# Patient Record
Sex: FEMALE | Race: WHITE | HISPANIC OR LATINO | Employment: UNEMPLOYED | URBAN - METROPOLITAN AREA
[De-identification: names, ages, dates, MRNs, and addresses within clinical notes are randomized per-mention and may not be internally consistent; named-entity substitution may affect disease eponyms.]

---

## 2020-01-27 LAB
HBV SURFACE AG SERPL QL IA: NEGATIVE
HIV 1+2 AB+HIV1 P24 AG SERPL QL IA: NEGATIVE
RUBELLA ABY IGG: NORMAL
TREPONEMA ANTIBODIES: NONREACTIVE

## 2020-07-05 ENCOUNTER — HOSPITAL ENCOUNTER (INPATIENT)
Facility: CLINIC | Age: 35
LOS: 2 days | Discharge: HOME | End: 2020-07-07
Attending: OBSTETRICS & GYNECOLOGY | Admitting: OBSTETRICS & GYNECOLOGY
Payer: COMMERCIAL

## 2020-07-05 PROBLEM — Z36.89 ENCOUNTER FOR TRIAGE IN PREGNANT PATIENT: Status: ACTIVE | Noted: 2020-07-05

## 2020-07-05 LAB
ABO + RH BLD: NORMAL
ABO + RH BLD: NORMAL
AMPHETAMINES UR QL SCN: NEGATIVE
BASOPHILS # BLD AUTO: 0 10E9/L (ref 0–0.2)
BASOPHILS NFR BLD AUTO: 0.3 %
BLD GP AB SCN SERPL QL: NORMAL
BLOOD BANK CMNT PATIENT-IMP: NORMAL
CANNABINOIDS UR QL: NEGATIVE
COCAINE UR QL: NEGATIVE
DIFFERENTIAL METHOD BLD: ABNORMAL
EOSINOPHIL # BLD AUTO: 0.1 10E9/L (ref 0–0.7)
EOSINOPHIL NFR BLD AUTO: 0.8 %
ERYTHROCYTE [DISTWIDTH] IN BLOOD BY AUTOMATED COUNT: 13.8 % (ref 10–15)
GLUCOSE BLDC GLUCOMTR-MCNC: 95 MG/DL (ref 70–99)
GLUCOSE SERPL-MCNC: 115 MG/DL (ref 70–99)
HCT VFR BLD AUTO: 37.1 % (ref 35–47)
HGB BLD-MCNC: 12.3 G/DL (ref 11.7–15.7)
IMM GRANULOCYTES # BLD: 0.1 10E9/L (ref 0–0.4)
IMM GRANULOCYTES NFR BLD: 0.7 %
LYMPHOCYTES # BLD AUTO: 1.7 10E9/L (ref 0.8–5.3)
LYMPHOCYTES NFR BLD AUTO: 13.5 %
MCH RBC QN AUTO: 28.7 PG (ref 26.5–33)
MCHC RBC AUTO-ENTMCNC: 33.2 G/DL (ref 31.5–36.5)
MCV RBC AUTO: 87 FL (ref 78–100)
MONOCYTES # BLD AUTO: 0.9 10E9/L (ref 0–1.3)
MONOCYTES NFR BLD AUTO: 7 %
NEUTROPHILS # BLD AUTO: 9.6 10E9/L (ref 1.6–8.3)
NEUTROPHILS NFR BLD AUTO: 77.7 %
NRBC # BLD AUTO: 0 10*3/UL
NRBC BLD AUTO-RTO: 0 /100
OPIATES UR QL SCN: NEGATIVE
PCP UR QL SCN: NEGATIVE
PLATELET # BLD AUTO: 261 10E9/L (ref 150–450)
RBC # BLD AUTO: 4.28 10E12/L (ref 3.8–5.2)
SARS-COV-2 PCR COMMENT: NORMAL
SARS-COV-2 RNA SPEC QL NAA+PROBE: NEGATIVE
SARS-COV-2 RNA SPEC QL NAA+PROBE: NORMAL
SPECIMEN EXP DATE BLD: NORMAL
SPECIMEN SOURCE: NORMAL
SPECIMEN SOURCE: NORMAL
WBC # BLD AUTO: 12.4 10E9/L (ref 4–11)

## 2020-07-05 PROCEDURE — G0463 HOSPITAL OUTPT CLINIC VISIT: HCPCS

## 2020-07-05 PROCEDURE — U0003 INFECTIOUS AGENT DETECTION BY NUCLEIC ACID (DNA OR RNA); SEVERE ACUTE RESPIRATORY SYNDROME CORONAVIRUS 2 (SARS-COV-2) (CORONAVIRUS DISEASE [COVID-19]), AMPLIFIED PROBE TECHNIQUE, MAKING USE OF HIGH THROUGHPUT TECHNOLOGIES AS DESCRIBED BY CMS-2020-01-R: HCPCS | Performed by: OBSTETRICS & GYNECOLOGY

## 2020-07-05 PROCEDURE — 72200001 ZZH LABOR CARE VAGINAL DELIVERY SINGLE

## 2020-07-05 PROCEDURE — 25800030 ZZH RX IP 258 OP 636: Performed by: OBSTETRICS & GYNECOLOGY

## 2020-07-05 PROCEDURE — 80307 DRUG TEST PRSMV CHEM ANLYZR: CPT | Performed by: OBSTETRICS & GYNECOLOGY

## 2020-07-05 PROCEDURE — 86900 BLOOD TYPING SEROLOGIC ABO: CPT | Performed by: OBSTETRICS & GYNECOLOGY

## 2020-07-05 PROCEDURE — 25000125 ZZHC RX 250: Performed by: OBSTETRICS & GYNECOLOGY

## 2020-07-05 PROCEDURE — 00000146 ZZHCL STATISTIC GLUCOSE BY METER IP

## 2020-07-05 PROCEDURE — 85025 COMPLETE CBC W/AUTO DIFF WBC: CPT | Performed by: OBSTETRICS & GYNECOLOGY

## 2020-07-05 PROCEDURE — 12000000 ZZH R&B MED SURG/OB

## 2020-07-05 PROCEDURE — 25000132 ZZH RX MED GY IP 250 OP 250 PS 637

## 2020-07-05 PROCEDURE — 86901 BLOOD TYPING SEROLOGIC RH(D): CPT | Performed by: OBSTETRICS & GYNECOLOGY

## 2020-07-05 PROCEDURE — 25000128 H RX IP 250 OP 636: Performed by: OBSTETRICS & GYNECOLOGY

## 2020-07-05 PROCEDURE — 82947 ASSAY GLUCOSE BLOOD QUANT: CPT | Performed by: OBSTETRICS & GYNECOLOGY

## 2020-07-05 PROCEDURE — 0HQ9XZZ REPAIR PERINEUM SKIN, EXTERNAL APPROACH: ICD-10-PCS | Performed by: OBSTETRICS & GYNECOLOGY

## 2020-07-05 PROCEDURE — 86850 RBC ANTIBODY SCREEN: CPT | Performed by: OBSTETRICS & GYNECOLOGY

## 2020-07-05 PROCEDURE — 86780 TREPONEMA PALLIDUM: CPT | Performed by: OBSTETRICS & GYNECOLOGY

## 2020-07-05 PROCEDURE — 25000132 ZZH RX MED GY IP 250 OP 250 PS 637: Performed by: OBSTETRICS & GYNECOLOGY

## 2020-07-05 RX ORDER — BISACODYL 10 MG
10 SUPPOSITORY, RECTAL RECTAL DAILY PRN
Status: DISCONTINUED | OUTPATIENT
Start: 2020-07-07 | End: 2020-07-07 | Stop reason: HOSPADM

## 2020-07-05 RX ORDER — SODIUM CHLORIDE, SODIUM LACTATE, POTASSIUM CHLORIDE, CALCIUM CHLORIDE 600; 310; 30; 20 MG/100ML; MG/100ML; MG/100ML; MG/100ML
INJECTION, SOLUTION INTRAVENOUS CONTINUOUS
Status: DISCONTINUED | OUTPATIENT
Start: 2020-07-05 | End: 2020-07-05

## 2020-07-05 RX ORDER — NALOXONE HYDROCHLORIDE 0.4 MG/ML
.1-.4 INJECTION, SOLUTION INTRAMUSCULAR; INTRAVENOUS; SUBCUTANEOUS
Status: DISCONTINUED | OUTPATIENT
Start: 2020-07-05 | End: 2020-07-05

## 2020-07-05 RX ORDER — TRANEXAMIC ACID 10 MG/ML
1 INJECTION, SOLUTION INTRAVENOUS EVERY 30 MIN PRN
Status: DISCONTINUED | OUTPATIENT
Start: 2020-07-05 | End: 2020-07-07 | Stop reason: HOSPADM

## 2020-07-05 RX ORDER — IBUPROFEN 800 MG/1
800 TABLET, FILM COATED ORAL EVERY 6 HOURS PRN
Status: DISCONTINUED | OUTPATIENT
Start: 2020-07-05 | End: 2020-07-07 | Stop reason: HOSPADM

## 2020-07-05 RX ORDER — CARBOPROST TROMETHAMINE 250 UG/ML
250 INJECTION, SOLUTION INTRAMUSCULAR
Status: DISCONTINUED | OUTPATIENT
Start: 2020-07-05 | End: 2020-07-05

## 2020-07-05 RX ORDER — AMOXICILLIN 250 MG
2 CAPSULE ORAL 2 TIMES DAILY
Status: DISCONTINUED | OUTPATIENT
Start: 2020-07-05 | End: 2020-07-07 | Stop reason: HOSPADM

## 2020-07-05 RX ORDER — CARBOPROST TROMETHAMINE 250 UG/ML
250 INJECTION, SOLUTION INTRAMUSCULAR
Status: DISCONTINUED | OUTPATIENT
Start: 2020-07-05 | End: 2020-07-07 | Stop reason: HOSPADM

## 2020-07-05 RX ORDER — NICOTINE POLACRILEX 4 MG
15-30 LOZENGE BUCCAL
Status: DISCONTINUED | OUTPATIENT
Start: 2020-07-05 | End: 2020-07-07 | Stop reason: HOSPADM

## 2020-07-05 RX ORDER — HYDROCORTISONE 2.5 %
CREAM (GRAM) TOPICAL 3 TIMES DAILY PRN
Status: DISCONTINUED | OUTPATIENT
Start: 2020-07-05 | End: 2020-07-07 | Stop reason: HOSPADM

## 2020-07-05 RX ORDER — MISOPROSTOL 200 UG/1
800 TABLET ORAL ONCE
Status: COMPLETED | OUTPATIENT
Start: 2020-07-05 | End: 2020-07-05

## 2020-07-05 RX ORDER — MODIFIED LANOLIN
OINTMENT (GRAM) TOPICAL
Status: DISCONTINUED | OUTPATIENT
Start: 2020-07-05 | End: 2020-07-07 | Stop reason: HOSPADM

## 2020-07-05 RX ORDER — ONDANSETRON 2 MG/ML
4 INJECTION INTRAMUSCULAR; INTRAVENOUS EVERY 6 HOURS PRN
Status: DISCONTINUED | OUTPATIENT
Start: 2020-07-05 | End: 2020-07-05

## 2020-07-05 RX ORDER — METHYLERGONOVINE MALEATE 0.2 MG/ML
200 INJECTION INTRAVENOUS
Status: COMPLETED | OUTPATIENT
Start: 2020-07-05 | End: 2020-07-05

## 2020-07-05 RX ORDER — ACETAMINOPHEN 325 MG/1
650 TABLET ORAL EVERY 4 HOURS PRN
Status: DISCONTINUED | OUTPATIENT
Start: 2020-07-05 | End: 2020-07-05

## 2020-07-05 RX ORDER — OXYTOCIN 10 [USP'U]/ML
10 INJECTION, SOLUTION INTRAMUSCULAR; INTRAVENOUS
Status: DISCONTINUED | OUTPATIENT
Start: 2020-07-05 | End: 2020-07-05

## 2020-07-05 RX ORDER — OXYTOCIN/0.9 % SODIUM CHLORIDE 30/500 ML
100-340 PLASTIC BAG, INJECTION (ML) INTRAVENOUS CONTINUOUS PRN
Status: COMPLETED | OUTPATIENT
Start: 2020-07-05 | End: 2020-07-05

## 2020-07-05 RX ORDER — FENTANYL CITRATE 50 UG/ML
50-100 INJECTION, SOLUTION INTRAMUSCULAR; INTRAVENOUS
Status: DISCONTINUED | OUTPATIENT
Start: 2020-07-05 | End: 2020-07-05

## 2020-07-05 RX ORDER — MISOPROSTOL 200 UG/1
TABLET ORAL
Status: COMPLETED
Start: 2020-07-05 | End: 2020-07-05

## 2020-07-05 RX ORDER — OXYTOCIN 10 [USP'U]/ML
10 INJECTION, SOLUTION INTRAMUSCULAR; INTRAVENOUS
Status: DISCONTINUED | OUTPATIENT
Start: 2020-07-05 | End: 2020-07-07 | Stop reason: HOSPADM

## 2020-07-05 RX ORDER — ACETAMINOPHEN 325 MG/1
650 TABLET ORAL EVERY 4 HOURS PRN
Status: DISCONTINUED | OUTPATIENT
Start: 2020-07-05 | End: 2020-07-07 | Stop reason: HOSPADM

## 2020-07-05 RX ORDER — NALOXONE HYDROCHLORIDE 0.4 MG/ML
.1-.4 INJECTION, SOLUTION INTRAMUSCULAR; INTRAVENOUS; SUBCUTANEOUS
Status: DISCONTINUED | OUTPATIENT
Start: 2020-07-05 | End: 2020-07-07 | Stop reason: HOSPADM

## 2020-07-05 RX ORDER — DEXTROSE MONOHYDRATE 25 G/50ML
25-50 INJECTION, SOLUTION INTRAVENOUS
Status: DISCONTINUED | OUTPATIENT
Start: 2020-07-05 | End: 2020-07-07 | Stop reason: HOSPADM

## 2020-07-05 RX ORDER — OXYCODONE AND ACETAMINOPHEN 5; 325 MG/1; MG/1
1 TABLET ORAL
Status: DISCONTINUED | OUTPATIENT
Start: 2020-07-05 | End: 2020-07-05

## 2020-07-05 RX ORDER — MISOPROSTOL 200 UG/1
800 TABLET ORAL
Status: DISCONTINUED | OUTPATIENT
Start: 2020-07-05 | End: 2020-07-07 | Stop reason: HOSPADM

## 2020-07-05 RX ORDER — OXYTOCIN/0.9 % SODIUM CHLORIDE 30/500 ML
100 PLASTIC BAG, INJECTION (ML) INTRAVENOUS CONTINUOUS
Status: DISCONTINUED | OUTPATIENT
Start: 2020-07-05 | End: 2020-07-07 | Stop reason: HOSPADM

## 2020-07-05 RX ORDER — AMOXICILLIN 250 MG
1 CAPSULE ORAL 2 TIMES DAILY
Status: DISCONTINUED | OUTPATIENT
Start: 2020-07-05 | End: 2020-07-07 | Stop reason: HOSPADM

## 2020-07-05 RX ORDER — TRANEXAMIC ACID 10 MG/ML
1 INJECTION, SOLUTION INTRAVENOUS EVERY 30 MIN PRN
Status: DISCONTINUED | OUTPATIENT
Start: 2020-07-05 | End: 2020-07-05

## 2020-07-05 RX ORDER — METHYLERGONOVINE MALEATE 0.2 MG/ML
200 INJECTION INTRAVENOUS
Status: DISCONTINUED | OUTPATIENT
Start: 2020-07-05 | End: 2020-07-07 | Stop reason: HOSPADM

## 2020-07-05 RX ORDER — IBUPROFEN 800 MG/1
800 TABLET, FILM COATED ORAL
Status: DISCONTINUED | OUTPATIENT
Start: 2020-07-05 | End: 2020-07-05

## 2020-07-05 RX ORDER — OXYTOCIN/0.9 % SODIUM CHLORIDE 30/500 ML
340 PLASTIC BAG, INJECTION (ML) INTRAVENOUS CONTINUOUS PRN
Status: DISCONTINUED | OUTPATIENT
Start: 2020-07-05 | End: 2020-07-07 | Stop reason: HOSPADM

## 2020-07-05 RX ADMIN — LIDOCAINE HYDROCHLORIDE 20 ML: 10 INJECTION, SOLUTION EPIDURAL; INFILTRATION; INTRACAUDAL; PERINEURAL at 14:30

## 2020-07-05 RX ADMIN — IBUPROFEN 800 MG: 800 TABLET ORAL at 22:56

## 2020-07-05 RX ADMIN — METHYLERGONOVINE MALEATE 200 MCG: 0.2 INJECTION, SOLUTION INTRAMUSCULAR; INTRAVENOUS at 14:24

## 2020-07-05 RX ADMIN — Medication 340 ML/HR: at 14:18

## 2020-07-05 RX ADMIN — IBUPROFEN 800 MG: 800 TABLET ORAL at 15:42

## 2020-07-05 RX ADMIN — SODIUM CHLORIDE, POTASSIUM CHLORIDE, SODIUM LACTATE AND CALCIUM CHLORIDE 500 ML/HR: 600; 310; 30; 20 INJECTION, SOLUTION INTRAVENOUS at 12:45

## 2020-07-05 RX ADMIN — MISOPROSTOL 800 MCG: 200 TABLET ORAL at 14:19

## 2020-07-05 NOTE — PLAN OF CARE
Data: Alison Khan transferred to 442 via wheelchair at 1850. Baby transferred via parent's arms.  Action: Receiving unit notified of transfer: Yes. Patient and family notified of room change. Report given to Jen at 1850. Belongings sent to receiving unit. Accompanied by Registered Nurse. Oriented patient to surroundings. Call light within reach. ID bands double-checked with receiving RN.  Response: Patient tolerated transfer and is stable.

## 2020-07-05 NOTE — PROVIDER NOTIFICATION
Everton Guerra William, no call needed.   Baby is assigned to this group because they are doc-of-the-day: Yes.

## 2020-07-05 NOTE — PLAN OF CARE
Alison here with SROM around 10am.  Grossly ruptured with clear fluid on admission.  Ctx's every 3-5 minutes and palpate strong.  7cm/90/0.  Baby active and cat 1 tracing.  Dr. Mcgrath updated and here at 1300.  Alison does speak English but requesting  for medical explanations.  Reviewed Health hx again, POC and baby meds/blood sugar checks with  over Ipad.  Stated understanding.  Wanting to deliver without pain medications.

## 2020-07-05 NOTE — H&P
OB History and Physical      Alison Khan MRN# [id number]   Age: 34 year old YOB: 1985     CC:  SROM, labor    An professional Welsh interpretor was used for this visit via the ipad.    HPI:  Ms. Alison Khan is a 34 year old  at 39w3d by LMP c/w 19 week US who presents with SROM and spontaneous active labor. She reports +FM. No vaginal bleeding. She denies recent cough, cold symptoms, or fevers. No known COVID-19 symptoms.     She received prenatal care at [facility] and had planned to deliver there. Her first two children were born there and she denies any complications with her first two pregnancies.     Prenatal records have been requested.     Pregnancy Complications:  - Scant prenatal care- reports last visit was 6 weeks ago. Reports all visits since 3 months of pregnancy have been via telephone.   - Per prenatal records- presumed type II DM. Failed 1 hr and 2 hr GTT at 16 weeks. Unknown control due to infrequent testing.   - AMA: declined genetic testing    Prenatal Labs:   AB+, antibody screen negative, Rubella immune, RPR non-reactive, HBSAG non-reactive, HIV non-reactive, urine culture negative, GC/chalm negative, pap smear NILM (19)    1 hr GCT: 159  2 hr GTT: 88, 217 (high), 205 (high)    Lab Results   Component Value Date    ABO PENDING 2020    AS PENDING 2020    HGB 12.3 2020       GBS Status: unknown    OB History   2001: [facility], 8lb 6oz, 3rd degree laceration   13: [facility], 7lb 6oz, PPH. Did not require transfusion. Discharge hemoglobin 7.3 per records.  SAB at 14 weeks 19    PMHx: denies  PSHx: Gallbladder surgery one year ago   Meds: Prenatal vitamins  Allergies:  No Known Allergies   FmHx: Non-contributory  SocHx: She denies any tobacco, alcohol, or other drug use during this pregnancy.    ROS:   Complete 10-point ROS negative except as noted in HPI. She denies headache, blurry vision, chest pain, shortness of breath, RUQ pain, nausea,  vomiting, dysuria, hematuria or extremity edema.    PE:  Vit:   Patient Vitals for the past 4 hrs:   BP Temp Temp src Resp   20 1215 104/68 98  F (36.7  C) Oral 18      Gen: Well-appearing, NAD, uncomfortable with contractions  Pulm: Breathing comfortably on room air  Abd: Soft, gravid, non-tender  Ext: no LE edema b/l  Cx: 7cm per RN    Pres:  cephalic by cervical exam  EFW:  8lb by Leopold's  Memb: SROM, grossly ruptured, clear              FHT: Baseline 135, moderate variability, + accelerations, intermittent early decelerations   [place]: q2-3 minutes      Assessment: 34 year old  at 39w3d here with SROM and active labor. Had planned delivery at [facility]. Pregnancy complicated by scant prenatal care and presumed type II DM (diagnosed at 16 weeks) with unknown control. COVID-19 PCR pending.    Plan  Admit to L&D  Labor: Expectant management  Type II DM versus GDM: diagnosed at 16 weeks. Unknown control as did not regularly check BG levels.   FWB: Category I FHT.  Continue EFM and toco  GBS:  unknown. Penicillin not indicated.  Pain: Desires unmedicated delivery  Rh: AB+  Anticipate       Margot Mcgrath MD  [facility]  2020 1:15 PM

## 2020-07-05 NOTE — L&D DELIVERY NOTE
OB Vaginal Delivery Note      HPI:  Pt is a 34 year old  @ 39w3d who presented to L&D on 20 for SROM and active labor.      Prenatal Course:  [place] [facility] [place] Scant prenatal care.     Prenatal labs:  AB+, antibody screen negative, Rubella immune, RPR non-reactive, HBSAG non-reactive, HIV non-reactive, urine culture negative, GC/chalm negative, pap smear NILM (19); GBS UNKNOWN     1 hr GCT: 159  2 hr GTT: 88, 217 (high), 205 (high)    Pregnancy complications:    - Scant prenatal care- reports last visit was 6 weeks ago. Reports all visits since 3 months of pregnancy have been via telephone.   - Per prenatal records- presumed type II DM. Failed 1 hr and 2 hr GTT at 16 weeks. Unknown control due to infrequent testing.  - AMA, declined genetic testing    OB History:   History of  x2, history of PPH (did not require transfusion, discharge hemoglobin 7.3)    Hospital Course:    First Stage:  Patient was admitted to L&D on 20 for SROM and labor.  FHTs were category I, reactive. Contractions were every 2-3 minutes.  Abdomen was non-tender.  EFW was 8lb.  SROM occurred at 10am with clear fluid noted.  Random glucose was 115 on arrival. Patient's labor progressed quickly to complete.  She declined analgesia.  Patient reached complete cervical dilation at 1350 on 2020.    Second Stage:  Patient was allowed to begin pushing after reaching complete cervical dilation.  Good maternal expulsive efforts were noted.  Fetal heart tones remained reassuring during the second stage.  She was able to bring the fetal vertex to a full crown.  The fetal vertex was then easily delivered, followed by the fetal shoulders without complications. The remainder of the infant was then delivered. After 60 seconds the cord was clamped and cut and the infant was placed on the maternal abdomen.  The infants weight was pending at the time of this note.  Apgars were 9 and 9 at one and five minutes.      Third  Stage:  The placenta then delivered shortly thereafter.  It was noted to be in tact with a three vessel cord.  The patient's perineum was inspected and a first degree laceration was noted. The uterus was boggy with brisk bleeding and she received pitocin through the IV, IM methergine, and 800 mcg rectal cytotec with improvement in bleeding and uterine tone. The area was infiltrated with 1% lidocaine and repaired in the usual fashion using 3-0 vicryl suture.  EBL for the procedure was 500 ml.  COVID-19 PCR pending at the time of delivery.    The patient tolerated the delivery well.  Sponge and needle counts were correct.  The patient and infant remained in the delivery suite following delivery in stable condition.    Margot Mcgrath MD  7/5/2020 2:51 PM

## 2020-07-06 ENCOUNTER — APPOINTMENT (OUTPATIENT)
Dept: INTERPRETER SERVICES | Facility: CLINIC | Age: 35
End: 2020-07-06
Payer: COMMERCIAL

## 2020-07-06 LAB
GLUCOSE BLDC GLUCOMTR-MCNC: 77 MG/DL (ref 70–99)
HGB BLD-MCNC: 10.6 G/DL (ref 11.7–15.7)
T PALLIDUM AB SER QL: NONREACTIVE

## 2020-07-06 PROCEDURE — 00000146 ZZHCL STATISTIC GLUCOSE BY METER IP

## 2020-07-06 PROCEDURE — 12000000 ZZH R&B MED SURG/OB

## 2020-07-06 PROCEDURE — 85018 HEMOGLOBIN: CPT | Performed by: OBSTETRICS & GYNECOLOGY

## 2020-07-06 PROCEDURE — 25000132 ZZH RX MED GY IP 250 OP 250 PS 637: Performed by: OBSTETRICS & GYNECOLOGY

## 2020-07-06 PROCEDURE — 36415 COLL VENOUS BLD VENIPUNCTURE: CPT | Performed by: OBSTETRICS & GYNECOLOGY

## 2020-07-06 RX ADMIN — IBUPROFEN 800 MG: 800 TABLET ORAL at 06:13

## 2020-07-06 RX ADMIN — IBUPROFEN 800 MG: 800 TABLET ORAL at 18:30

## 2020-07-06 RX ADMIN — IBUPROFEN 800 MG: 800 TABLET ORAL at 12:25

## 2020-07-06 RX ADMIN — DOCUSATE SODIUM AND SENNOSIDES 1 TABLET: 8.6; 5 TABLET ORAL at 12:25

## 2020-07-06 NOTE — PROGRESS NOTES
OB Post-partum Note  PPD# 1    S:  Patient doing well.  Pain controlled.  Voiding.  Bleeding is normal.  Breast feeding. Patient reports typical cramping post partum.    O:  BP 90/56   Temp 99  F (37.2  C) (Oral)   Resp 20   Breastfeeding Unknown   Gen- A&O, NAD  Abd- Non-tender, fundus non tender and firm   Ext- non-tender, no calf pain    Hemoglobin   Date Value Ref Range Status   2020 10.6 (L) 11.7 - 15.7 g/dL Final     AB positive  Rubella Immune    A/P: 34 year old  PPD# 1 s/p . No apparent post partum complications.  Prenatal care elsewhere, routine labs reviewed and complete.  GBS status was unknown.     1.  Routine post-partum cares  2.  Analgesia, Ibuprofen for cramps discussed  3.  Discharge is anticipated tomorrow (discussed discharge today with the patient)  4.  The plan of care was discussed with the patient and her .   They expressed understanding and agreement        Noah Garner MD  2020  7:45 AM

## 2020-07-06 NOTE — PLAN OF CARE
VSS.  Minimal bleeding.  Up indep in room.  BS 95 after meal.  Heat and Ibu adeq for pain relief.

## 2020-07-06 NOTE — PLAN OF CARE
VSS. Tylenol and Ibuprofen for pain. BS this AM 77.  Independent with self cares.  at bedside for support.

## 2020-07-06 NOTE — PLAN OF CARE
Data: Vital signs within normal limits. Postpartum checks within normal limits - see flow record. Patient eating and drinking normally. Patient able to empty bladder independently and is up ambulating. No apparent signs of infection.  Patient performing self cares and is able to care for infant.  Action: Patient medicated during the shift for cramping. See MAR. Patient reassessed within 1 hour after each medication and pain was improved - patient stated she was comfortable. Patient education done about breast and formula feeding. See flow record.  Response: Positive attachment behaviors observed with infant. Support person present.   Plan: Anticipate discharge on 7/7/20.

## 2020-07-07 ENCOUNTER — VIRTUAL VISIT (OUTPATIENT)
Dept: INTERPRETER SERVICES | Facility: CLINIC | Age: 35
End: 2020-07-07

## 2020-07-07 ENCOUNTER — APPOINTMENT (OUTPATIENT)
Dept: INTERPRETER SERVICES | Facility: CLINIC | Age: 35
End: 2020-07-07
Payer: COMMERCIAL

## 2020-07-07 VITALS
RESPIRATION RATE: 16 BRPM | HEART RATE: 78 BPM | DIASTOLIC BLOOD PRESSURE: 66 MMHG | TEMPERATURE: 97.9 F | SYSTOLIC BLOOD PRESSURE: 103 MMHG

## 2020-07-07 PROCEDURE — T1013 SIGN LANG/ORAL INTERPRETER: HCPCS | Mod: U3,TEL

## 2020-07-07 PROCEDURE — 25000132 ZZH RX MED GY IP 250 OP 250 PS 637: Performed by: OBSTETRICS & GYNECOLOGY

## 2020-07-07 RX ORDER — IBUPROFEN 800 MG/1
800 TABLET, FILM COATED ORAL EVERY 6 HOURS PRN
Qty: 40 TABLET | Refills: 0 | Status: SHIPPED | OUTPATIENT
Start: 2020-07-07 | End: 2022-12-05

## 2020-07-07 RX ORDER — ACETAMINOPHEN 325 MG/1
975 TABLET ORAL EVERY 6 HOURS PRN
Qty: 60 TABLET | Refills: 0 | Status: SHIPPED | OUTPATIENT
Start: 2020-07-07 | End: 2022-12-05

## 2020-07-07 RX ADMIN — IBUPROFEN 800 MG: 800 TABLET ORAL at 12:14

## 2020-07-07 RX ADMIN — IBUPROFEN 800 MG: 800 TABLET ORAL at 00:29

## 2020-07-07 RX ADMIN — IBUPROFEN 800 MG: 800 TABLET ORAL at 06:32

## 2020-07-07 RX ADMIN — DOCUSATE SODIUM AND SENNOSIDES 1 TABLET: 8.6; 5 TABLET ORAL at 10:04

## 2020-07-07 NOTE — PLAN OF CARE
Data: Vital signs within normal limits. Postpartum checks within normal limits - see flow record. Patient eating and drinking normally. Patient able to empty bladder independently and is up ambulating. No apparent signs of infection. Patient performing self cares and is able to care for infant.  Action: Patient medicated during the shift for cramping. See MAR. Patient reassessed within 1 hour after each medication and pain was improved - patient stated she was comfortable. Patient education done about taking infants temperature and following up with providers. See flow record.  Response: Positive attachment behaviors observed with infant. Support person present.   Plan: Anticipate discharge on 7/7/2020.

## 2020-07-07 NOTE — PROGRESS NOTES
OB Post-partum Note  PPD# 2    S:  Patient doing well.  Pain controlled.  Voiding.  Bleeding is normal.  Breast feeding.    O:  BP 96/67   Pulse 91   Temp 97.9  F (36.6  C) (Oral)   Resp 15   Breastfeeding yes  Gen- A&O, NAD  Abd- Non-tender, fundus non tender and firm   Ext- non-tender, no calf pain    Hemoglobin   Date Value Ref Range Status   2020 10.6 (L) 11.7 - 15.7 g/dL Final     AB+  Rubella Immune    A/P: 34 year old  PPD# 2 s/p     1.  Routine post-partum cares  2.  Analgesia tylenol and ibuprofen  3.  Discharge today  4.  The plan of care was discussed with the patient.  She expressed understanding and agreement  5.  RTC in 6 weeks, will need f/u for DM - possible type 2.  6.  Indications to call or return were discussed. Post partum instructions were given  7. [place] [facility] f/u there or at [facility].  8. DM - presumed type 2 with abnormal 3 hr GTT at 16 weeks, minimal testing during pregnancy. Fasting BS 77 yesterday morning.  Will need f/u at 6 week PP visit.  9. Covid neg      Freya Cast MD  2020  8:52 AM

## 2020-07-07 NOTE — DISCHARGE INSTRUCTIONS
Vaginal Delivery Discharge Instructions: [place]  Lactation [phone]  Follow up in OB clinic in 6 weeks  Actividad:     Pida a los miembros de morris katty y amigos que la ayuden cuando lo necesite.    No ponga nada en morris vagina hasta que morris médico lo permita.    Tómese las próximas semanas con calma para que morris cuerpo tenga tiempo de recuperarse. En wendi momento puede hacer cualquier actividad que sienta que puede.    No conduzca si está tomando píldoras para el dolor recetadas por morris médico. Puede conducir si está tomando píldoras de venta toribio para el dolor.    Llame a morris proveedor de atención médica si tiene alguno de estos síntomas:    Empapa rocky toalla femenina con margaret en el correr de 1 hora o ve coágulos más grandes que rocky pelota de golf.    Sangrado que dura más de 6 semanas.    Tiene rocky secreción vaginal que huele mal.     Fiebre de 100.4  F (38  C) o más (temperatura tomada bajo morris lengua) con o sin escalofríos     Dolor, calambres o sensibilidad graves en la región inferior de morris vientre.    Aumento del dolor, hinchazón, enrojecimiento o líquido alrededor de ahsan puntos.    Necesidad más frecuente o urgente de orinar (hacer pis), o ardor al hacerlo.    Enrojecimiento, hinchazón o dolor alrededor de rocky vena en morris pierna.    Problemas para amamantar o un área enrojecida o dolorosa en morris pecho.    Dolor que aumenta o no se va de rocky episiotomía o desgarro en el perineo.    Náuseas y vómitos    Dolor en el pecho y tos o dificultad para respirar.    Problemas para manejar la tristeza, ansiedad o depresión.     Si le preocupa hacerse daño o hacerle daño al bebé, llame al médico de inmediato.     Tiene preguntas o inquietudes después de regresar a casa.    Mantenga ahsan lexy limpias:  Lávese siempre las lexy antes de tocar el área de morris perineo y los puntos.  [place] ayuda a reducir morris riesgo de infección.  Si ahsan lexy no están sucias, puede usar un gel de alcohol para limpiarse las lexy. Mantenga ahsan  uñas cortas y limpias.      Vaginal Delivery Discharge Instructions  Activity:     Ask family and friends for help when you need it.    Do not place anything in your vagina until your doctor approves.    Take it easy for the next few weeks to allow your body to recover. You may do any activities you feel up to at that point.    Do not drive while taking pain pills prescribed by your doctor. You may drive if taking over-the-counter pain pills.    Call your health care provider if you have any of these symptoms:    You soak a sanitary pad with blood within 1 hour, or you see blood clots larger than a golf ball.    Bleeding that lasts more than 6 weeks.    You have vaginal discharge that smells bad.     A fever of 100.4  F (38  C) or higher (temperature taken under your tongue), with or without chills     Severe, pain, cramping or tenderness in your lower belly area.    Increased pain, swelling, redness or fluid around your stitches.    A more frequent or urgent need to urinate (pee), or it burns when you pee.    Redness, swelling or pain around a vein in your leg.    Problems breastfeeding, or a red or painful area on your breast.    Pain that increases or does not go away from an episiotomy or perineal tear.    Nausea and vomiting.    Chest pain and cough or are gasping for air.    Problems coping with sadness, anxiety, or depression.     If you have any concerns about hurting yourself or the baby, call your doctor right away.     You have questions or concerns after you return home.    Keep your hands clean:  Always wash your hands before touching your perineal area and stitches.  This helps reduce your risk of infection.  If your hands aren t dirty, you may use an alcohol hand-rub to clean your hands. Keep your nails clean and short.

## 2020-07-07 NOTE — PLAN OF CARE
Pt meeting expected outcomes. Vitals stable. Fundus firm and midline. Denies difficulty voiding. Pain controlled with ibuprofen. Independent with self and baby cares. Breastfeeding  independently.

## 2020-07-13 ENCOUNTER — MEDICAL CORRESPONDENCE (OUTPATIENT)
Dept: HEALTH INFORMATION MANAGEMENT | Facility: CLINIC | Age: 35
End: 2020-07-13

## 2021-02-04 ENCOUNTER — HOSPITAL ENCOUNTER (EMERGENCY)
Facility: CLINIC | Age: 36
Discharge: HOME | End: 2021-02-05
Attending: EMERGENCY MEDICINE | Admitting: EMERGENCY MEDICINE
Payer: COMMERCIAL

## 2021-02-04 VITALS
DIASTOLIC BLOOD PRESSURE: 82 MMHG | RESPIRATION RATE: 18 BRPM | TEMPERATURE: 98.2 F | SYSTOLIC BLOOD PRESSURE: 113 MMHG | HEART RATE: 100 BPM | OXYGEN SATURATION: 100 %

## 2021-02-04 DIAGNOSIS — T15.92XA ACUTE FOREIGN BODY OF LEFT EYE, INITIAL ENCOUNTER: ICD-10-CM

## 2021-02-04 DIAGNOSIS — S05.02XA LEFT CORNEAL ABRASION, INITIAL ENCOUNTER: ICD-10-CM

## 2021-02-04 PROCEDURE — 99283 EMERGENCY DEPT VISIT LOW MDM: CPT

## 2021-02-04 PROCEDURE — 250N000009 HC RX 250: Performed by: EMERGENCY MEDICINE

## 2021-02-04 RX ORDER — ERYTHROMYCIN 5 MG/G
0.5 OINTMENT OPHTHALMIC 4 TIMES DAILY
Qty: 1 G | Refills: 0 | Status: SHIPPED | OUTPATIENT
Start: 2021-02-04 | End: 2022-12-05

## 2021-02-04 RX ORDER — TETRACAINE HYDROCHLORIDE 5 MG/ML
2 SOLUTION OPHTHALMIC ONCE
Status: DISCONTINUED | OUTPATIENT
Start: 2021-02-04 | End: 2021-02-05 | Stop reason: HOSPADM

## 2021-02-04 RX ORDER — TETRACAINE HYDROCHLORIDE 5 MG/ML
SOLUTION OPHTHALMIC
Status: DISCONTINUED
Start: 2021-02-04 | End: 2021-02-05 | Stop reason: HOSPADM

## 2021-02-04 ASSESSMENT — ENCOUNTER SYMPTOMS
EYE REDNESS: 1
EYE PAIN: 1
EYE ITCHING: 1

## 2021-02-04 NOTE — LETTER
02/04/21      To Whom it may concern:    Kallie Khan was in our Emergency Department today, 02/04/21. with a patient who needed their assistance.  Please excuse them from work/school.      Sincerely,      GALINA Kaiser

## 2021-02-05 ASSESSMENT — ENCOUNTER SYMPTOMS: PHOTOPHOBIA: 0

## 2021-02-05 NOTE — ED PROVIDER NOTES
History   Chief Complaint:  Eye Pain     The history is provided by the patient.   Patient declined formal  and daughter at bedside is interpreting for patient.     Alison Medeiros is a 35 year old female with history who presents to the ER for evaluation of left eye pain and feeling of left eyelid foreign body.  3 days prior, patient states she was rubbing her eye when she felt a poke and since then has noted feeling like something is rubbing her eye in the upper eyelid.  She denies any acute event of feeling like something flew into her eye.  Since then, she has had increased redness and tearing.  She denies any pus drainage from the eye or eyelid swelling.  She denies any pain with extraocular movements or photophobia.  She denies any contact lens use.  She denies any visual changes, loss of vision, blurry vision.    Review of Systems   Eyes: Positive for pain, redness and itching. Negative for photophobia and visual disturbance.   All other systems reviewed and are negative.      Allergies:  No known drug allergies     Medications:  The patient is not currently taking any prescribed medications.     Past Medical History:    Diabetes     Past Surgical History:    The patient denies past surgical history.     Family History:   The patient denies past family history.     Social History:  Smoking status: no  Alcohol use: no  Drug use: no  Presents to the ED     Physical Exam     Patient Vitals for the past 24 hrs:   BP Temp Temp src Pulse Resp SpO2   02/04/21 2145 113/82 98.2  F (36.8  C) Temporal 100 18 100 %       Physical Exam  Eyes:         General: the patient is awake and interactive  HEENT:  Moist mucous membranes.  Pupils equal and reactive bilaterally.  Right conjunctiva normal.  Left conjunctival injection sparing limbus (conjunctival injection more prominent in the lateral aspect of the left eye) Eyelids everted, tiny/punctate brown foreign body appreciated to the lateral aspect of the  inner eyelid. VA - right eye 20/30, left eye 20/50.  Slit lamp - anterior chamber without cell/flare, small linear fluorescein uptake noted to left cornea at 1 o'clock position, Adela sign negative, no dendrite or ulcer  Pulmonary:  Normal respiratory effort  Cardiovascular:  Well perfused  Musculoskeletal:  Moving 4 extremities grossly wnl, no deformities  Neuro:  Speech normal, no focal deficits    Emergency Department Course       Procedures     Foreign Body Removal        LOCATION:  Left inner upper eyelid       FOREIGN BODY: Small punctate brown foreign body    PROCEDURE: The foreign body was grasped using small tweezers and the foreign body was successfully removed. There were no immediate complications. The patient tolerated the procedure well.     Emergency Department Course:  Reviewed:  I reviewed the patient's nursing notes, vitals, past medical records, Care Everywhere.     Assessments:  2300 I performed an assessment and examination of the patient as noted above.     2324 I performed a foreign body removal on the eye, as noted above.     2347 Findings and plan explained to the Patient. Patient discharged home with instructions regarding supportive care, medications, and reasons to return. The importance of close follow-up was reviewed.       Disposition:  The patient was discharged to home.       Impression & Plan   Medical Decision Making:  Alison Medeiros is a 35 year old female with left eye pain redness and pain with FB sensation. Slit lamp and fluorescein exam reveals small corneal abrasion. No evidence of ulceration or dendrite. She also had notable small brown foreign body that was removed from the inner upper eyelid and felt relief after removal.  Suspect her corneal abrasion was from this foreign body.  No sign of open globe, iritis/uveitis, glaucoma, preseptal/sepal cellulitis or other emergent ocular pathology.  She does not wear contacts. She was given a prescription for erythromycin  ointment, and instructions to follow up with ophthalmology if symptoms persist.  She is otherwise safe to discharge home.  Discussed the signs and symptoms that should prompt her to return to the ER.  All questions were answered prior to discharge.    Diagnosis:    ICD-10-CM    1. Left corneal abrasion, initial encounter  S05.02XA    2. Acute foreign body of left eye, initial encounter  T15.92XA        Discharge Medications:  Discharge Medication List as of 2/4/2021 11:55 PM      START taking these medications    Details   erythromycin (ROMYCIN) 5 MG/GM ophthalmic ointment Place 0.5 inches Into the left eye 4 times dailyDisp-1 g, R-0Local Print             Scribe Disclosure:  I, Cynthia Mendoza, am serving as a scribe at 10:06 PM on 2/4/2021 to document services personally performed by Sabas Villegas MD based on my observations and the provider's statements to me.             Sabas Villegas MD  02/05/21 0420

## 2021-02-05 NOTE — ED TRIAGE NOTES
Pt arrives with complaints of L eye pain, redness, and itching for about three days. Pt says it feels like there is something in the eye, has tried irrigation with no improvement. Pt says she rubbed her eye and then started having the sensation that something was in it. ABCs intact, A/O x4.

## 2022-12-05 ENCOUNTER — HOSPITAL ENCOUNTER (EMERGENCY)
Facility: CLINIC | Age: 37
Discharge: HOME | End: 2022-12-05
Attending: EMERGENCY MEDICINE | Admitting: EMERGENCY MEDICINE
Payer: COMMERCIAL

## 2022-12-05 VITALS
HEART RATE: 84 BPM | SYSTOLIC BLOOD PRESSURE: 114 MMHG | TEMPERATURE: 97 F | WEIGHT: 144.4 LBS | OXYGEN SATURATION: 99 % | RESPIRATION RATE: 22 BRPM | DIASTOLIC BLOOD PRESSURE: 76 MMHG

## 2022-12-05 DIAGNOSIS — J11.1 INFLUENZA-LIKE ILLNESS: ICD-10-CM

## 2022-12-05 DIAGNOSIS — B30.9 VIRAL CONJUNCTIVITIS OF BOTH EYES: ICD-10-CM

## 2022-12-05 DIAGNOSIS — H66.002 NON-RECURRENT ACUTE SUPPURATIVE OTITIS MEDIA OF LEFT EAR WITHOUT SPONTANEOUS RUPTURE OF TYMPANIC MEMBRANE: ICD-10-CM

## 2022-12-05 LAB
FLUAV RNA SPEC QL NAA+PROBE: NEGATIVE
FLUBV RNA RESP QL NAA+PROBE: NEGATIVE
RSV RNA SPEC NAA+PROBE: NEGATIVE
SARS-COV-2 RNA RESP QL NAA+PROBE: NEGATIVE

## 2022-12-05 PROCEDURE — 87637 SARSCOV2&INF A&B&RSV AMP PRB: CPT | Performed by: EMERGENCY MEDICINE

## 2022-12-05 PROCEDURE — 99283 EMERGENCY DEPT VISIT LOW MDM: CPT | Mod: CS

## 2022-12-05 PROCEDURE — C9803 HOPD COVID-19 SPEC COLLECT: HCPCS

## 2022-12-05 ASSESSMENT — ENCOUNTER SYMPTOMS
EYE DISCHARGE: 1
COUGH: 1
FEVER: 1

## 2022-12-05 NOTE — RESULT ENCOUNTER NOTE
Negative for Influenza A, Influenza B, RSV and Covid19.  Patient will receive the Covid19 result via [facility] [facility] and a letter will be sent via [employer] (if active) or via the mail

## 2022-12-05 NOTE — ED TRIAGE NOTES
Cough cold sick for 3 weeks now having left ear pain     Triage Assessment     Row Name 12/05/22 050       Triage Assessment (Adult)    Airway WDL WDL       Respiratory WDL    Respiratory WDL cough       Skin Circulation/Temperature WDL    Skin Circulation/Temperature WDL WDL       Cardiac WDL    Cardiac WDL WDL       Peripheral/Neurovascular WDL    Peripheral Neurovascular WDL WDL

## 2022-12-05 NOTE — ED PROVIDER NOTES
History     Chief Complaint:  Otalgia     The history is provided by the patient and a relative.      Alison Medeiros is a 37 year old female with history of diabetes who presents with 2-3 weeks of congestion, cough, and fevers. Patient then developed left ear pain today. No ear drainage or right ear pain. She has been having watery discharge from her bilateral eyes as well.    Review of Systems   Constitutional: Positive for fever.   HENT: Positive for congestion and ear pain (left ear). Negative for ear discharge.    Eyes: Positive for discharge.   Respiratory: Positive for cough.    All other systems reviewed and are negative.    Allergies:  No Known Allergies    Medications:  The patient is not currently taking any prescribed medications.    Past Medical History:     Diabetes mellitus    Social History:  The patient presents to the ED with a family member  Arrives via private vehicle  Speaks [place]    Physical Exam     Patient Vitals for the past 24 hrs:   BP Temp Temp src Pulse Resp SpO2 Weight   12/05/22 0508 114/76 97  F (36.1  C) Temporal 84 22 99 % 65.5 kg (144 lb 6.4 oz)     Physical Exam  Nursing note and vitals reviewed.  Constitutional: Cooperative.   HENT:   Mouth/Throat: Mucous membranes are normal. Oropharynx normal.   Ears: Right TM normal. Left TM bulging and erythematous.   No mastoid tenderness  Eyes: Pupils are equal, round, and reactive to light. Watery discharge from bilateral eyes. Conjunctival inflammation of bilateral eyes.   Cardiovascular: Normal rate, regular rhythm and normal heart sounds.  No murmur.  Pulmonary/Chest: Effort normal and breath sounds normal. No respiratory distress. No wheezes. No rales.   Neurological: Alert. Oriented x4  Skin: Skin is warm and dry.   Psychiatric: Normal mood and affect.     Emergency Department Course     Laboratory:  Covid/Influenza/RSV: negative.       Reviewed:  I reviewed nursing notes, vitals and past medical  history    Assessments:  0522 I obtained history and examined the patient as noted above.     Disposition:  The patient was discharged to home.     Impression & Plan     Medical Decision Making:  Alison Medeiros is a 37 year old female who presents for evaluation of congestion, cough, and fevers. This is consistent with an influenza like illness and viral swabs are currently pending (Now negative).  Patient understands the sensitivity of influenza rapid test.  She will follow up with her lab results via [facility]. They are at risk for pneumonia but no signs of this are detected on today's visit.  Additionally, patient developed left ear pain today. The patient has an exam consistent with acute suppurative otitis media on the left side.  There is no sign of mastoiditis, meningitis, perforation, mass, dental abscess, or peritonsillar abscess. There is no evidence of otitis externa.  The patient will be started on antibiotics and may take Tylenol or Ibuprofen for pain. Close followup of primary care physician is indicated and return to the ED for high fevers > 103 for more than 48 hours more, increasing productive cough, shortness of breath, or confusion.  There is no signs of serious bacterial infection such as bacteremia, meningitis, UTI/pyelonephritis, strep pharyngitis, etc.  All questions answered.     Diagnosis:    ICD-10-CM    1. Influenza-like illness  J11.1       2. Non-recurrent acute suppurative otitis media of left ear without spontaneous rupture of tympanic membrane  H66.002       3. Viral conjunctivitis of both eyes  B30.9           Discharge Medications:  Discharge Medication List as of 12/5/2022  5:28 AM      START taking these medications    Details   amoxicillin-clavulanate (AUGMENTIN) 875-125 MG tablet Take 1 tablet by mouth 2 times daily, Disp-20 tablet, R-0, Local Print             Scribe Disclosure:  Delisa TELLEZ, am serving as a scribe at 5:18 AM on 12/5/2022 to document services  personally performed by Benny Pastrana MD based on my observations and the provider's statements to me.            Benny Pastrana MD  12/05/22 0637